# Patient Record
Sex: FEMALE | Race: BLACK OR AFRICAN AMERICAN | Employment: OTHER | URBAN - NONMETROPOLITAN AREA
[De-identification: names, ages, dates, MRNs, and addresses within clinical notes are randomized per-mention and may not be internally consistent; named-entity substitution may affect disease eponyms.]

---

## 2024-05-29 ENCOUNTER — OFFICE VISIT (OUTPATIENT)
Facility: CLINIC | Age: 79
End: 2024-05-29

## 2024-05-29 ENCOUNTER — HOSPITAL ENCOUNTER (OUTPATIENT)
Age: 79
Discharge: HOME | End: 2024-06-01

## 2024-05-29 VITALS
HEIGHT: 63 IN | SYSTOLIC BLOOD PRESSURE: 128 MMHG | DIASTOLIC BLOOD PRESSURE: 70 MMHG | RESPIRATION RATE: 17 BRPM | OXYGEN SATURATION: 97 % | WEIGHT: 145.2 LBS | HEART RATE: 77 BPM | BODY MASS INDEX: 25.73 KG/M2 | TEMPERATURE: 97.8 F

## 2024-05-29 DIAGNOSIS — Z00.00 HEALTHCARE MAINTENANCE: Primary | ICD-10-CM

## 2024-05-29 DIAGNOSIS — Z00.00 HEALTHCARE MAINTENANCE: ICD-10-CM

## 2024-05-29 LAB — SENTARA SPECIMEN COLLECTION: NORMAL

## 2024-05-29 PROCEDURE — 99001 SPECIMEN HANDLING PT-LAB: CPT

## 2024-05-29 SDOH — ECONOMIC STABILITY: FOOD INSECURITY: WITHIN THE PAST 12 MONTHS, THE FOOD YOU BOUGHT JUST DIDN'T LAST AND YOU DIDN'T HAVE MONEY TO GET MORE.: NEVER TRUE

## 2024-05-29 SDOH — ECONOMIC STABILITY: FOOD INSECURITY: WITHIN THE PAST 12 MONTHS, YOU WORRIED THAT YOUR FOOD WOULD RUN OUT BEFORE YOU GOT MONEY TO BUY MORE.: NEVER TRUE

## 2024-05-29 SDOH — ECONOMIC STABILITY: INCOME INSECURITY: HOW HARD IS IT FOR YOU TO PAY FOR THE VERY BASICS LIKE FOOD, HOUSING, MEDICAL CARE, AND HEATING?: NOT HARD AT ALL

## 2024-05-29 SDOH — ECONOMIC STABILITY: HOUSING INSECURITY
IN THE LAST 12 MONTHS, WAS THERE A TIME WHEN YOU DID NOT HAVE A STEADY PLACE TO SLEEP OR SLEPT IN A SHELTER (INCLUDING NOW)?: NO

## 2024-05-29 ASSESSMENT — PATIENT HEALTH QUESTIONNAIRE - PHQ9
1. LITTLE INTEREST OR PLEASURE IN DOING THINGS: NOT AT ALL
SUM OF ALL RESPONSES TO PHQ9 QUESTIONS 1 & 2: 0
SUM OF ALL RESPONSES TO PHQ QUESTIONS 1-9: 0
2. FEELING DOWN, DEPRESSED OR HOPELESS: NOT AT ALL
SUM OF ALL RESPONSES TO PHQ QUESTIONS 1-9: 0

## 2024-05-29 NOTE — PROGRESS NOTES
Karen Rivas presents today for   Chief Complaint   Patient presents with    New Patient     New patient - here to establish care       Is someone accompanying this pt? no    Is the patient using any DME equipment during OV? no    Health Maintenance Due   Topic Date Due    Depression Screen  Never done    DTaP/Tdap/Td vaccine (1 - Tdap) Never done    Shingles vaccine (1 of 2) Never done    DEXA (modify frequency per FRAX score)  Never done    Respiratory Syncytial Virus (RSV) Pregnant or age 60 yrs+ (1 - 1-dose 60+ series) Never done    Pneumococcal 65+ years Vaccine (1 of 1 - PCV) Never done    COVID-19 Vaccine (6 - 2023-24 season) 09/01/2023       \"Have you been to the ER, urgent care clinic since your last visit?  Hospitalized since your last visit?\"    NO    “Have you seen or consulted any other health care providers outside of [facility] since your last visit?”    NO

## 2024-05-29 NOTE — PROGRESS NOTES
NEW PATIENT    History of Present Illness    Chief Complaint   Patient presents with    New Patient     New patient - here to establish care       Karen Rivas is a 79 y.o. female who presents today for establishment of care. Histories reviewed in detail and are outlined below.     No past medical history   Has not seen a doctor in years   Feels well overall- occasionally has aches/pains- takes Tylenol PRN      in   4 kids, lots of grandkids and great grandkids   Never smoker       Past Medical History  History reviewed. No pertinent past medical history.     Surgical History  Past Surgical History:   Procedure Laterality Date    COLON SURGERY      patient unsure what the surgery was for    LAPAROSCOPIC HYSTERECTOMY      LIVER SURGERY      mass removed        Current Medications  No current outpatient medications on file.     No current facility-administered medications for this visit.       Allergies/Drug Reactions  No Known Allergies     Family History  History reviewed. No pertinent family history.     Social History  Social History     Socioeconomic History    Marital status: Unknown     Spouse name: None    Number of children: None    Years of education: None    Highest education level: None   Tobacco Use    Smoking status: Never    Smokeless tobacco: Never   Vaping Use    Vaping Use: Never used     Social Determinants of Health     Financial Resource Strain: Low Risk  (2024)    Overall Financial Resource Strain (CARDIA)     Difficulty of Paying Living Expenses: Not hard at all   Food Insecurity: No Food Insecurity (2024)    Hunger Vital Sign     Worried About Running Out of Food in the Last Year: Never true     Ran Out of Food in the Last Year: Never true   Transportation Needs: Unknown (2024)    PRAPARE - Transportation     Lack of Transportation (Non-Medical): No   Housing Stability: Unknown (2024)    Housing Stability Vital Sign     Unstable Housing in the Last Year:

## 2024-06-06 ENCOUNTER — OFFICE VISIT (OUTPATIENT)
Facility: CLINIC | Age: 79
End: 2024-06-06
Payer: MEDICARE

## 2024-06-06 ENCOUNTER — HOSPITAL ENCOUNTER (OUTPATIENT)
Age: 79
Discharge: HOME | End: 2024-06-06
Payer: MEDICARE

## 2024-06-06 VITALS
BODY MASS INDEX: 26.45 KG/M2 | HEART RATE: 85 BPM | TEMPERATURE: 97.7 F | WEIGHT: 149.3 LBS | SYSTOLIC BLOOD PRESSURE: 130 MMHG | DIASTOLIC BLOOD PRESSURE: 79 MMHG | OXYGEN SATURATION: 95 % | HEIGHT: 63 IN | RESPIRATION RATE: 18 BRPM

## 2024-06-06 DIAGNOSIS — J40 BRONCHITIS: Primary | ICD-10-CM

## 2024-06-06 DIAGNOSIS — J40 BRONCHITIS: ICD-10-CM

## 2024-06-06 PROCEDURE — 71046 X-RAY EXAM CHEST 2 VIEWS: CPT

## 2024-06-06 PROCEDURE — 1123F ACP DISCUSS/DSCN MKR DOCD: CPT | Performed by: STUDENT IN AN ORGANIZED HEALTH CARE EDUCATION/TRAINING PROGRAM

## 2024-06-06 PROCEDURE — 99213 OFFICE O/P EST LOW 20 MIN: CPT | Performed by: STUDENT IN AN ORGANIZED HEALTH CARE EDUCATION/TRAINING PROGRAM

## 2024-06-06 RX ORDER — BENZONATATE 100 MG/1
100 CAPSULE ORAL 3 TIMES DAILY PRN
Qty: 30 CAPSULE | Refills: 0 | Status: SHIPPED | OUTPATIENT
Start: 2024-06-06 | End: 2024-06-16

## 2024-06-06 RX ORDER — GUAIFENESIN/DEXTROMETHORPHAN 100-10MG/5
5 SYRUP ORAL 3 TIMES DAILY PRN
Qty: 120 ML | Refills: 0 | Status: SHIPPED | OUTPATIENT
Start: 2024-06-06 | End: 2024-06-16

## 2024-06-06 RX ORDER — AZITHROMYCIN 250 MG/1
TABLET, FILM COATED ORAL
Qty: 6 TABLET | Refills: 0 | Status: SHIPPED | OUTPATIENT
Start: 2024-06-06 | End: 2024-06-16

## 2024-06-06 NOTE — PROGRESS NOTES
Subjective:       Karen Rivas is a 79 y.o. female who presents for evaluation of symptoms of a URI. Symptoms include achiness and productive cough with  blood streaked colored sputum. Onset of symptoms was 7 days ago, gradually worsening since that time. Treatment to date: OTC cough suppressant.  Patient's medications, allergies, past medical, surgical, social and family histories were reviewed and updated as appropriate.    Review of Systems  Pertinent items are noted in HPI.     Objective:      /79 (Site: Right Upper Arm, Position: Sitting, Cuff Size: Medium Adult)   Pulse 85   Temp 97.7 °F (36.5 °C) (Temporal)   Resp 18   Ht 1.6 m (5' 3\")   Wt 67.7 kg (149 lb 4.8 oz)   SpO2 95%   BMI 26.45 kg/m²   General appearance: alert, appears stated age, and cooperative  Lungs: diminished breath sounds bibasilar  Heart: regular rate and rhythm, S1, S2 normal, no murmur, click, rub or gallop     Assessment:      bronchitis     Plan:      Discussed dx and tx of URIs  Suggested symptomatic OTC remedies.  Zithromax per orders.   CXR

## 2024-06-06 NOTE — PROGRESS NOTES
Karen Rivas presents today for   Chief Complaint   Patient presents with    Cough     Patient has a cough and shortness of breath since Thursday. The cough get worse at night.       Is someone accompanying this pt?no    Is the patient using any DME equipment during OV? no    Health Maintenance Due   Topic Date Due    DTaP/Tdap/Td vaccine (1 - Tdap) Never done    Shingles vaccine (1 of 2) Never done    DEXA (modify frequency per FRAX score)  Never done    Respiratory Syncytial Virus (RSV) Pregnant or age 60 yrs+ (1 - 1-dose 60+ series) Never done    Pneumococcal 65+ years Vaccine (1 of 1 - PCV) Never done    COVID-19 Vaccine (6 - 2023-24 season) 09/01/2023       \"Have you been to the ER, urgent care clinic since your last visit?  Hospitalized since your last visit?\"    NO    “Have you seen or consulted any other health care providers outside of [facility] since your last visit?”    NO

## 2024-06-07 ENCOUNTER — TELEPHONE (OUTPATIENT)
Facility: CLINIC | Age: 79
End: 2024-06-07

## 2024-06-07 NOTE — TELEPHONE ENCOUNTER
Called patient to speak about message from Torey Waters MD. Confirmed name and date of birth. Spoke with patient about message. Patient expressed understanding.

## 2024-06-07 NOTE — TELEPHONE ENCOUNTER
----- Message from Torey Waters MD sent at 6/7/2024  7:20 AM EDT -----  Chest xray was concerning for pneumonia. Azithromycin/ Z-pack is the appropriate treatment for this. If you are not improved by Monday, please give the office a call  ----- Message -----  From: Grey Garcia Incoming Orders Results To [facility]  Sent: 6/7/2024   3:15 AM EDT  To: Torey Waters MD

## 2025-05-27 SDOH — HEALTH STABILITY: PHYSICAL HEALTH: ON AVERAGE, HOW MANY DAYS PER WEEK DO YOU ENGAGE IN MODERATE TO STRENUOUS EXERCISE (LIKE A BRISK WALK)?: 0 DAYS

## 2025-05-27 SDOH — HEALTH STABILITY: PHYSICAL HEALTH: ON AVERAGE, HOW MANY MINUTES DO YOU ENGAGE IN EXERCISE AT THIS LEVEL?: 0 MIN

## 2025-05-27 ASSESSMENT — LIFESTYLE VARIABLES
HOW OFTEN DO YOU HAVE A DRINK CONTAINING ALCOHOL: 1
HOW OFTEN DO YOU HAVE SIX OR MORE DRINKS ON ONE OCCASION: 1

## 2025-06-02 ENCOUNTER — RESULTS FOLLOW-UP (OUTPATIENT)
Facility: CLINIC | Age: 80
End: 2025-06-02

## 2025-06-02 ENCOUNTER — HOSPITAL ENCOUNTER (OUTPATIENT)
Age: 80
Discharge: HOME | End: 2025-06-05

## 2025-06-02 ENCOUNTER — HOSPITAL ENCOUNTER (OUTPATIENT)
Age: 80
Discharge: HOME | End: 2025-06-05
Payer: MEDICARE

## 2025-06-02 ENCOUNTER — OFFICE VISIT (OUTPATIENT)
Facility: CLINIC | Age: 80
End: 2025-06-02
Payer: MEDICARE

## 2025-06-02 VITALS
TEMPERATURE: 97.6 F | RESPIRATION RATE: 18 BRPM | BODY MASS INDEX: 25.09 KG/M2 | HEART RATE: 76 BPM | HEIGHT: 63 IN | OXYGEN SATURATION: 97 % | DIASTOLIC BLOOD PRESSURE: 78 MMHG | SYSTOLIC BLOOD PRESSURE: 123 MMHG | WEIGHT: 141.6 LBS

## 2025-06-02 DIAGNOSIS — Z13.820 ENCOUNTER FOR OSTEOPOROSIS SCREENING IN ASYMPTOMATIC POSTMENOPAUSAL PATIENT: ICD-10-CM

## 2025-06-02 DIAGNOSIS — E78.2 MIXED HYPERLIPIDEMIA: ICD-10-CM

## 2025-06-02 DIAGNOSIS — Z00.00 MEDICARE ANNUAL WELLNESS VISIT, SUBSEQUENT: ICD-10-CM

## 2025-06-02 DIAGNOSIS — R05.3 CHRONIC COUGH: Primary | ICD-10-CM

## 2025-06-02 DIAGNOSIS — Z78.0 ENCOUNTER FOR OSTEOPOROSIS SCREENING IN ASYMPTOMATIC POSTMENOPAUSAL PATIENT: ICD-10-CM

## 2025-06-02 DIAGNOSIS — E78.5 HYPERLIPIDEMIA, UNSPECIFIED HYPERLIPIDEMIA TYPE: ICD-10-CM

## 2025-06-02 DIAGNOSIS — Z00.00 MEDICARE ANNUAL WELLNESS VISIT, SUBSEQUENT: Primary | ICD-10-CM

## 2025-06-02 DIAGNOSIS — N18.31 STAGE 3A CHRONIC KIDNEY DISEASE (HCC): ICD-10-CM

## 2025-06-02 DIAGNOSIS — R05.3 CHRONIC COUGH: ICD-10-CM

## 2025-06-02 DIAGNOSIS — R93.89 ABNORMAL CHEST X-RAY: ICD-10-CM

## 2025-06-02 DIAGNOSIS — M81.0 AGE-RELATED OSTEOPOROSIS WITHOUT CURRENT PATHOLOGICAL FRACTURE: ICD-10-CM

## 2025-06-02 LAB — SENTARA SPECIMEN COLLECTION: NORMAL

## 2025-06-02 PROCEDURE — 1160F RVW MEDS BY RX/DR IN RCRD: CPT | Performed by: STUDENT IN AN ORGANIZED HEALTH CARE EDUCATION/TRAINING PROGRAM

## 2025-06-02 PROCEDURE — 99214 OFFICE O/P EST MOD 30 MIN: CPT | Performed by: STUDENT IN AN ORGANIZED HEALTH CARE EDUCATION/TRAINING PROGRAM

## 2025-06-02 PROCEDURE — G0439 PPPS, SUBSEQ VISIT: HCPCS | Performed by: STUDENT IN AN ORGANIZED HEALTH CARE EDUCATION/TRAINING PROGRAM

## 2025-06-02 PROCEDURE — 1123F ACP DISCUSS/DSCN MKR DOCD: CPT | Performed by: STUDENT IN AN ORGANIZED HEALTH CARE EDUCATION/TRAINING PROGRAM

## 2025-06-02 PROCEDURE — 1159F MED LIST DOCD IN RCRD: CPT | Performed by: STUDENT IN AN ORGANIZED HEALTH CARE EDUCATION/TRAINING PROGRAM

## 2025-06-02 PROCEDURE — 71046 X-RAY EXAM CHEST 2 VIEWS: CPT

## 2025-06-02 PROCEDURE — 99001 SPECIMEN HANDLING PT-LAB: CPT

## 2025-06-02 SDOH — ECONOMIC STABILITY: FOOD INSECURITY: WITHIN THE PAST 12 MONTHS, YOU WORRIED THAT YOUR FOOD WOULD RUN OUT BEFORE YOU GOT MONEY TO BUY MORE.: SOMETIMES TRUE

## 2025-06-02 SDOH — ECONOMIC STABILITY: FOOD INSECURITY: WITHIN THE PAST 12 MONTHS, THE FOOD YOU BOUGHT JUST DIDN'T LAST AND YOU DIDN'T HAVE MONEY TO GET MORE.: SOMETIMES TRUE

## 2025-06-02 ASSESSMENT — PATIENT HEALTH QUESTIONNAIRE - PHQ9
6. FEELING BAD ABOUT YOURSELF - OR THAT YOU ARE A FAILURE OR HAVE LET YOURSELF OR YOUR FAMILY DOWN: NOT AT ALL
SUM OF ALL RESPONSES TO PHQ QUESTIONS 1-9: 12
8. MOVING OR SPEAKING SO SLOWLY THAT OTHER PEOPLE COULD HAVE NOTICED. OR THE OPPOSITE, BEING SO FIGETY OR RESTLESS THAT YOU HAVE BEEN MOVING AROUND A LOT MORE THAN USUAL: NOT AT ALL
10. IF YOU CHECKED OFF ANY PROBLEMS, HOW DIFFICULT HAVE THESE PROBLEMS MADE IT FOR YOU TO DO YOUR WORK, TAKE CARE OF THINGS AT HOME, OR GET ALONG WITH OTHER PEOPLE: NOT DIFFICULT AT ALL
1. LITTLE INTEREST OR PLEASURE IN DOING THINGS: NEARLY EVERY DAY
5. POOR APPETITE OR OVEREATING: NOT AT ALL
SUM OF ALL RESPONSES TO PHQ QUESTIONS 1-9: 12
SUM OF ALL RESPONSES TO PHQ QUESTIONS 1-9: 12
7. TROUBLE CONCENTRATING ON THINGS, SUCH AS READING THE NEWSPAPER OR WATCHING TELEVISION: NEARLY EVERY DAY
2. FEELING DOWN, DEPRESSED OR HOPELESS: NEARLY EVERY DAY
SUM OF ALL RESPONSES TO PHQ QUESTIONS 1-9: 12
4. FEELING TIRED OR HAVING LITTLE ENERGY: NEARLY EVERY DAY
9. THOUGHTS THAT YOU WOULD BE BETTER OFF DEAD, OR OF HURTING YOURSELF: NOT AT ALL
3. TROUBLE FALLING OR STAYING ASLEEP: NOT AT ALL

## 2025-06-02 ASSESSMENT — LIFESTYLE VARIABLES
HOW MANY STANDARD DRINKS CONTAINING ALCOHOL DO YOU HAVE ON A TYPICAL DAY: PATIENT DOES NOT DRINK
HOW OFTEN DO YOU HAVE A DRINK CONTAINING ALCOHOL: NEVER

## 2025-06-02 NOTE — PROGRESS NOTES
SICK VISIT     Karen Rivas (: 1945) is a 80 y.o. female here for evaluation of the following chief concerns(s):  Medicare AWV (Medicare wellness)       ASSESSMENT/PLAN:  1. Medicare annual wellness visit, subsequent  -     Comprehensive Metabolic Panel; Future  -     CBC; Future  2. Chronic cough  -     XR CHEST (2 VIEWS); Future  -     PFT SPIROMETRY W/BRONCHODILATOR AND DLCO; Future  3. Hyperlipidemia, unspecified hyperlipidemia type  -     Lipid Panel; Future  4. Encounter for osteoporosis screening in asymptomatic postmenopausal patient  -     DEXA BONE DENSITY AXIAL SKELETON; Future    Orders Placed This Encounter   Procedures    XR CHEST (2 VIEWS)     Standing Status:   Future     Expected Date:   2025     Expiration Date:   2026    DEXA BONE DENSITY AXIAL SKELETON           Standing Status:   Future     Expected Date:   2025     Expiration Date:   2026    Lipid Panel     Standing Status:   Future     Expected Date:   2025     Expiration Date:   2026    Comprehensive Metabolic Panel     Standing Status:   Future     Expected Date:   2025     Expiration Date:   2026    CBC     Standing Status:   Future     Expected Date:   2025     Expiration Date:   2026    PFT SPIROMETRY W/BRONCHODILATOR AND DLCO     Standing Status:   Future     Expected Date:   2025     Expiration Date:   2026     Labs, chest XR, PFT's per above  Seems possibly underlying asthma vs COPD vs PND given history  Will evaluate with these tests to start- refer to ENT vs pulmonology if ongoing and no answers    FU 4-6 weeks, after screening tests    Patient agrees with plan as above and has no additional questions at this time.     SUBJECTIVE/OBJECTIVE:    Cough   X years- patient states after she took the COVID shots  Used to get allergy shots- stopped >10 years  Used to take Tussinex which helped suppress the cough  Brings up clear mucous  + occasional runny nose. No sinus pressure or

## 2025-06-02 NOTE — PROGRESS NOTES
Medicare Annual Wellness Visit    Karen Rivas is here for Medicare AWV (Medicare wellness)    Assessment & Plan   Medicare annual wellness visit, subsequent    Vaccines discussed- she declines all vaccines     No follow-ups on file.     Subjective       No past medical history   Feels well overall- occasionally has aches/pains- takes Tylenol PRN  Takes vitamin D and vitamin C supplements daily       in   4 kids, lots of grandkids and great grandkids   Never smoker       Patient's complete Health Risk Assessment and screening values have been reviewed and are found in Flowsheets. The following problems were reviewed today and where indicated follow up appointments were made and/or referrals ordered.    Positive Risk Factor Screenings with Interventions:    Fall Risk:  Do you feel unsteady or are you worried about falling? : (!) (Patient-Rptd) yes  2 or more falls in past year?: (Patient-Rptd) no  Fall with injury in past year?: (Patient-Rptd) no     Interventions:    Reviewed medications, home hazards, visual acuity, and co-morbidities that can increase risk for falls    Cognitive:      Words recalled: 2 Words Recalled     Total Score Interpretation: Abnormal Mini-Cog  Interventions:  See AVS for additional education material    Depression:  PHQ-2 Score: 6  PHQ-9 Total Score: 12  Total Score Interpretation: 10-14 = moderate depression  Interventions:  Denies any overt depression, still grieving the loss of her           General HRA Questions:  Select all that apply: (!) (Patient-Rptd) New or Increased Fatigue, Stress  Interventions Fatigue:  See A/P for plan and any pertinent orders  Interventions - Stress:  See A/P for plan and any pertinent orders      Inactivity:  On average, how many days per week do you engage in moderate to strenuous exercise (like a brisk walk)?: (Patient-Rptd) 0 days (!) Abnormal  On average, how many minutes do you engage in exercise at this level?: (Patient-Rptd) 0

## 2025-06-02 NOTE — PROGRESS NOTES
Karen Rivas presents today for   Chief Complaint   Patient presents with    Medicare AWV     Medicare wellness       Is someone accompanying this pt? no    Is the patient using any DME equipment during OV? no    Risk Factor Screenings with Interventions     Fall Risk:  2 or more falls in past year?: (Patient-Rptd) no  Fall with injury in past year?: (Patient-Rptd) no    Alcohol:  Alcohol Use: Not At Risk (6/2/2025)    AUDIT-C     Frequency of Alcohol Consumption: Never     Average Number of Drinks: Patient does not drink     Frequency of Binge Drinking: Never       Depression:  PHQ-2 Score: 6    Cognitive:  Cognitive Unable to Assess: Pt Refuses  Words recalled: 2 Words Recalled  Total Score Interpretation: Abnormal Mini-Cog    Health Risk Assessment:     General  In general, how would you say your health is?: (Patient-Rptd) Fair  In the past 7 days, have you experienced any of the following: New or Increased Pain, New or Increased Fatigue, Loneliness, Social Isolation, Stress or Anger?: (!) (Patient-Rptd) Yes  Select all that apply: (!) (Patient-Rptd) New or Increased Fatigue, Stress      Health Habits/Nutrition  On average, how many days per week do you engage in moderate to strenuous exercise (like a brisk walk)?: (Patient-Rptd) 0 days  On average, how many minutes do you engage in exercise at this level?: (Patient-Rptd) 0 min  Do you eat balanced/healthy meals regularly?: (Patient-Rptd) Yes  Have you seen the dentist within the past year?: (!) (Patient-Rptd) No  Body mass index is 25.08 kg/m².      Hearing/Vision  Have you had an eye exam within the past year?: (Patient-Rptd) Appointment is scheduled  Do you have difficulty driving, watching TV, or doing any of your daily activities because of your eyesight?: (Patient-Rptd) No  Do you or your family notice any trouble with your hearing that hasn't been managed with hearing aids?: (Patient-Rptd) No      Safety  Do you have working smoke detectors?:

## 2025-06-02 NOTE — ACP (ADVANCE CARE PLANNING)
Patient has no formal advance directives  mPOA is daughter Denice Case  Discussed risks/benefits/alternatives of CPR, resuscitation  Patient would not want any resuscitation. Wants to pass away naturally. She elects DNR  Encourage formal advance directives, information given- [place] paperwork given, she is going to look over with her daughter.     ACP time <16 minutes   Jane Harris MD

## 2025-06-03 PROBLEM — N18.31 STAGE 3A CHRONIC KIDNEY DISEASE (HCC): Status: ACTIVE | Noted: 2025-06-03

## 2025-06-03 PROBLEM — E78.2 MIXED HYPERLIPIDEMIA: Status: ACTIVE | Noted: 2025-06-03

## 2025-06-03 LAB
A/G RATIO: 1.4 RATIO (ref 1.1–2.6)
ALBUMIN: 4.2 G/DL (ref 3.5–5)
ALP BLD-CCNC: 92 U/L (ref 40–120)
ALT SERPL-CCNC: 11 U/L (ref 5–40)
ANION GAP SERPL CALCULATED.3IONS-SCNC: 12 MMOL/L (ref 3–15)
AST SERPL-CCNC: 22 U/L (ref 10–37)
BILIRUB SERPL-MCNC: 0.9 MG/DL (ref 0.2–1.2)
BUN BLDV-MCNC: 18 MG/DL (ref 6–22)
CALCIUM SERPL-MCNC: 9.6 MG/DL (ref 8.4–10.5)
CHLORIDE BLD-SCNC: 96 MMOL/L (ref 98–110)
CHOLESTEROL, TOTAL: 277 MG/DL (ref 110–200)
CHOLESTEROL/HDL RATIO: 5.3 (ref 0–5)
CO2: 27 MMOL/L (ref 20–32)
CREAT SERPL-MCNC: 1 MG/DL (ref 0.8–1.4)
GFR, ESTIMATED: 53.7 ML/MIN/1.73 SQ.M.
GLOBULIN: 3.1 G/DL (ref 2–4)
GLUCOSE: 87 MG/DL (ref 70–99)
HCT VFR BLD CALC: 39.4 % (ref 35.1–48.3)
HDLC SERPL-MCNC: 52 MG/DL
HEMOGLOBIN: 13.4 G/DL (ref 11.7–16.1)
LDL CHOLESTEROL: 199 MG/DL (ref 50–99)
LDL/HDL RATIO: 3.8
MCH RBC QN AUTO: 28 PG (ref 26–34)
MCHC RBC AUTO-ENTMCNC: 34 G/DL (ref 31–36)
MCV RBC AUTO: 83 FL (ref 80–99)
NON-HDL CHOLESTEROL: 225 MG/DL
PDW BLD-RTO: 14.5 % (ref 10–15.5)
PLATELET # BLD: 255 K/UL (ref 140–440)
PMV BLD AUTO: 11.6 FL (ref 9–13)
POTASSIUM SERPL-SCNC: 3.5 MMOL/L (ref 3.5–5.5)
RBC # BLD: 4.77 M/UL (ref 3.8–5.2)
SODIUM BLD-SCNC: 135 MMOL/L (ref 133–145)
TOTAL PROTEIN: 7.3 G/DL (ref 6.2–8.1)
TRIGL SERPL-MCNC: 127 MG/DL (ref 40–149)
VLDLC SERPL CALC-MCNC: 25 MG/DL (ref 8–30)
WBC # BLD: 9.7 K/UL (ref 4–11)

## 2025-06-13 ENCOUNTER — HOSPITAL ENCOUNTER (OUTPATIENT)
Age: 80
Discharge: HOME | End: 2025-06-16
Attending: STUDENT IN AN ORGANIZED HEALTH CARE EDUCATION/TRAINING PROGRAM
Payer: MEDICARE

## 2025-06-13 VITALS — HEART RATE: 76 BPM | OXYGEN SATURATION: 94 % | RESPIRATION RATE: 20 BRPM

## 2025-06-13 DIAGNOSIS — R05.3 CHRONIC COUGH: ICD-10-CM

## 2025-06-13 PROCEDURE — 94726 PLETHYSMOGRAPHY LUNG VOLUMES: CPT

## 2025-06-13 PROCEDURE — 94060 EVALUATION OF WHEEZING: CPT

## 2025-06-13 PROCEDURE — 6360000002 HC RX W HCPCS: Performed by: STUDENT IN AN ORGANIZED HEALTH CARE EDUCATION/TRAINING PROGRAM

## 2025-06-13 PROCEDURE — 94640 AIRWAY INHALATION TREATMENT: CPT

## 2025-06-13 PROCEDURE — 94729 DIFFUSING CAPACITY: CPT

## 2025-06-13 RX ORDER — ALBUTEROL SULFATE 0.83 MG/ML
2.5 SOLUTION RESPIRATORY (INHALATION) ONCE
Status: COMPLETED | OUTPATIENT
Start: 2025-06-13 | End: 2025-06-13

## 2025-06-13 RX ADMIN — ALBUTEROL SULFATE 2.5 MG: 2.5 SOLUTION RESPIRATORY (INHALATION) at 10:05

## 2025-06-13 NOTE — PROGRESS NOTES
Unit dose albuterol given during PFT.  TX tolerated well.         06/13/25 1005   Treatment   Treatment Type HHN   $Treatment Type $Inhaled Therapy/Meds   Medications Albuterol   Pre-Tx Pulse 76   Pre-Tx Resps 20   Breath Sounds Pre-Tx ALICIA Clear   Breath Sounds Pre-Tx LLL Clear;Diminished   Breath Sounds Pre-Tx RUL Clear   Breath Sounds Pre-Tx RML Clear   Breath Sounds Pre-Tx RLL Clear;Diminished   Breath Sounds Post-Tx ALICIA Clear   Breath Sounds Post-Tx LLL Clear;Diminished   Breath Sounds Post-Tx RUL Clear   Breath Sounds Post-Tx RML Clear   Breath Sounds Post-Tx RLL Clear;Diminished   Post-Tx Pulse 80   Post-Tx Resps 20   Delivery Source Mouthpiece   Position Sitting   Treatment Tolerance Well   Duration 8   Is patient on O2? N   Breath Sounds   Respiratory Pattern Regular   Breath Sounds Bilateral Clear   Oxygen Therapy/Pulse Ox   O2 Therapy Room air   Pulse 76   Respirations 20   SpO2 94 %       
SW Note/Event Note

## 2025-06-16 ENCOUNTER — HOSPITAL ENCOUNTER (OUTPATIENT)
Age: 80
Discharge: HOME | End: 2025-06-19
Attending: STUDENT IN AN ORGANIZED HEALTH CARE EDUCATION/TRAINING PROGRAM
Payer: MEDICARE

## 2025-06-16 ENCOUNTER — HOSPITAL ENCOUNTER (OUTPATIENT)
Age: 80
End: 2025-06-16
Attending: STUDENT IN AN ORGANIZED HEALTH CARE EDUCATION/TRAINING PROGRAM
Payer: MEDICARE

## 2025-06-16 DIAGNOSIS — Z13.820 ENCOUNTER FOR OSTEOPOROSIS SCREENING IN ASYMPTOMATIC POSTMENOPAUSAL PATIENT: ICD-10-CM

## 2025-06-16 DIAGNOSIS — Z78.0 ENCOUNTER FOR OSTEOPOROSIS SCREENING IN ASYMPTOMATIC POSTMENOPAUSAL PATIENT: ICD-10-CM

## 2025-06-16 DIAGNOSIS — R93.89 ABNORMAL CHEST X-RAY: ICD-10-CM

## 2025-06-16 DIAGNOSIS — R05.3 CHRONIC COUGH: ICD-10-CM

## 2025-06-16 PROCEDURE — 77080 DXA BONE DENSITY AXIAL: CPT

## 2025-06-16 PROCEDURE — 6360000004 HC RX CONTRAST MEDICATION: Performed by: STUDENT IN AN ORGANIZED HEALTH CARE EDUCATION/TRAINING PROGRAM

## 2025-06-16 PROCEDURE — 71260 CT THORAX DX C+: CPT

## 2025-06-16 RX ORDER — IOPAMIDOL 755 MG/ML
100 INJECTION, SOLUTION INTRAVASCULAR
Status: COMPLETED | OUTPATIENT
Start: 2025-06-16 | End: 2025-06-16

## 2025-06-16 RX ADMIN — IOPAMIDOL 100 ML: 755 INJECTION, SOLUTION INTRAVENOUS at 09:08

## 2025-06-23 ENCOUNTER — RESULTS FOLLOW-UP (OUTPATIENT)
Facility: CLINIC | Age: 80
End: 2025-06-23

## 2025-06-23 DIAGNOSIS — R91.8 LUNG MASS: Primary | ICD-10-CM

## 2025-06-24 PROBLEM — M81.0 AGE-RELATED OSTEOPOROSIS WITHOUT CURRENT PATHOLOGICAL FRACTURE: Status: ACTIVE | Noted: 2025-06-24

## 2025-07-14 ENCOUNTER — OFFICE VISIT (OUTPATIENT)
Facility: CLINIC | Age: 80
End: 2025-07-14
Payer: MEDICARE

## 2025-07-14 VITALS
HEART RATE: 88 BPM | DIASTOLIC BLOOD PRESSURE: 75 MMHG | RESPIRATION RATE: 17 BRPM | HEIGHT: 63 IN | BODY MASS INDEX: 24.7 KG/M2 | SYSTOLIC BLOOD PRESSURE: 135 MMHG | OXYGEN SATURATION: 98 % | TEMPERATURE: 97.9 F | WEIGHT: 139.4 LBS

## 2025-07-14 DIAGNOSIS — E78.5 HYPERLIPIDEMIA, UNSPECIFIED HYPERLIPIDEMIA TYPE: ICD-10-CM

## 2025-07-14 DIAGNOSIS — R91.8 LUNG MASS: Primary | ICD-10-CM

## 2025-07-14 PROCEDURE — 99214 OFFICE O/P EST MOD 30 MIN: CPT | Performed by: STUDENT IN AN ORGANIZED HEALTH CARE EDUCATION/TRAINING PROGRAM

## 2025-07-14 PROCEDURE — 1159F MED LIST DOCD IN RCRD: CPT | Performed by: STUDENT IN AN ORGANIZED HEALTH CARE EDUCATION/TRAINING PROGRAM

## 2025-07-14 PROCEDURE — 1123F ACP DISCUSS/DSCN MKR DOCD: CPT | Performed by: STUDENT IN AN ORGANIZED HEALTH CARE EDUCATION/TRAINING PROGRAM

## 2025-07-14 NOTE — PROGRESS NOTES
Karen Rivas presents today for   Chief Complaint   Patient presents with    Follow-up     6 week follow up       Is someone accompanying this pt? no    Is the patient using any DME equipment during OV? no    Health Maintenance Due   Topic Date Due    DTaP/Tdap/Td vaccine (1 - Tdap) Never done    Shingles vaccine (1 of 2) Never done    Pneumococcal 50+ years Vaccine (1 of 1 - PCV) Never done    Respiratory Syncytial Virus (RSV) Pregnant or age 60 yrs+ (1 - 1-dose 75+ series) Never done    COVID-19 Vaccine (6 - 2024-25 season) 09/01/2024         \"Have you been to the ER, urgent care clinic since your last visit?  Hospitalized since your last visit?\"    NO    “Have you seen or consulted any other health care providers outside our system since your last visit?”    NO

## 2025-07-14 NOTE — PROGRESS NOTES
Chronic Disease Follow up    Karen Rivas (: 1945) is a 80 y.o. female here for evaluation of the following chief concerns(s):  Follow-up (6 week follow up)       ASSESSMENT/PLAN:  1. Lung mass  2. Hyperlipidemia, unspecified hyperlipidemia type    No orders of the defined types were placed in this encounter.    Lung mass - reviewed pulmonology note, PFT's- pending bronchoscopy with biopsy. Will follow along closely.    HLD- discussed today. Patient wants to hold off on considering tx with medication for now- may be amenable if biopsy is negative for cancer. If biopsy does show cancer- she prefers to focus on quality of life.     Patient agrees with plan as above and has no additional questions at this time.     SUBJECTIVE/OBJECTIVE:    Patient presents for follow up     She is feeling well. Appetite is good. Still having cough and SOB. She has bronchoscopy scheduled for Wednesday this week. She is not anxious. If it is cancer, she does not think she wants to pursue any chemotherapy- she watched her  go through this and it was horrible. She is ready to accept whatever diagnosis there is.     We reviewed her blood work from last month.       Vitals:    25 1348   BP: 135/75   Pulse: 88   Resp: 17   Temp: 97.9 °F (36.6 °C)   TempSrc: Temporal   SpO2: 98%   Weight: 63.2 kg (139 lb 6.4 oz)   Height: 1.6 m (5' 3\")      Body mass index is 24.69 kg/m².     Gen: NAD, well appearing   Psych: cooperative. Appropriate mood and affect.    I reviewed prior available labs.     Medical decision making complexity: moderate    I have discussed the diagnosis with the patient and the intended plan as seen in the above orders.  The patient has received an after-visit summary and questions were answered concerning future plans.  I have discussed medication side effects and warnings with the patient as well. I have reviewed the plan of care with the patient, accepted their input and they are in agreement with the

## 2025-08-05 ENCOUNTER — TRANSCRIBE ORDERS (OUTPATIENT)
Facility: HOSPITAL | Age: 80
End: 2025-08-05

## 2025-08-05 DIAGNOSIS — C34.82 MALIGNANT NEOPLASM OF OVERLAPPING SITES OF LEFT LUNG (HCC): Primary | ICD-10-CM

## 2025-08-21 ENCOUNTER — HOSPITAL ENCOUNTER (OUTPATIENT)
Age: 80
Discharge: HOME | End: 2025-08-24
Payer: MEDICARE

## 2025-08-21 DIAGNOSIS — C34.82 MALIGNANT NEOPLASM OF OVERLAPPING SITES OF LEFT LUNG (HCC): ICD-10-CM

## 2025-08-21 PROCEDURE — 2500000003 HC RX 250 WO HCPCS: Performed by: INTERNAL MEDICINE

## 2025-08-21 PROCEDURE — 70553 MRI BRAIN STEM W/O & W/DYE: CPT

## 2025-08-21 PROCEDURE — A9577 INJ MULTIHANCE: HCPCS | Performed by: INTERNAL MEDICINE

## 2025-08-21 PROCEDURE — 78815 PET IMAGE W/CT SKULL-THIGH: CPT

## 2025-08-21 PROCEDURE — 6360000004 HC RX CONTRAST MEDICATION: Performed by: INTERNAL MEDICINE

## 2025-08-21 PROCEDURE — A9609 HC RX DIAGNOSTIC RADIOPHARMACEUTICAL: HCPCS

## 2025-08-21 PROCEDURE — 82565 ASSAY OF CREATININE: CPT

## 2025-08-21 PROCEDURE — 3430000000 HC RX DIAGNOSTIC RADIOPHARMACEUTICAL

## 2025-08-21 RX ORDER — FLUDEOXYGLUCOSE F 18 200 MCI/ML
7.7 INJECTION, SOLUTION INTRAVENOUS
Status: COMPLETED | OUTPATIENT
Start: 2025-08-21 | End: 2025-08-21

## 2025-08-21 RX ADMIN — BARIUM SULFATE 450 ML: 20 SUSPENSION ORAL at 16:45

## 2025-08-21 RX ADMIN — FLUDEOXYGLUCOSE F 18 7.7 MILLICURIE: 200 INJECTION, SOLUTION INTRAVENOUS at 19:21

## 2025-08-21 RX ADMIN — GADOBENATE DIMEGLUMINE 14 ML: 529 INJECTION, SOLUTION INTRAVENOUS at 12:40

## 2025-08-22 LAB — CREAT UR-MCNC: 1.1 MG/DL (ref 0.6–1.3)
